# Patient Record
Sex: FEMALE | NOT HISPANIC OR LATINO | URBAN - METROPOLITAN AREA
[De-identification: names, ages, dates, MRNs, and addresses within clinical notes are randomized per-mention and may not be internally consistent; named-entity substitution may affect disease eponyms.]

---

## 2023-08-08 ENCOUNTER — APPOINTMENT (RX ONLY)
Dept: URBAN - METROPOLITAN AREA CLINIC 83 | Facility: CLINIC | Age: 33
Setting detail: DERMATOLOGY
End: 2023-08-08

## 2023-08-08 DIAGNOSIS — L72.11 PILAR CYST: ICD-10-CM

## 2023-08-08 PROCEDURE — ? ADDITIONAL NOTES

## 2023-08-08 PROCEDURE — 99202 OFFICE O/P NEW SF 15 MIN: CPT

## 2023-08-08 PROCEDURE — ? CONSULTATION FOR EXCISION

## 2023-08-08 ASSESSMENT — LOCATION ZONE DERM: LOCATION ZONE: SCALP

## 2023-08-08 ASSESSMENT — LOCATION SIMPLE DESCRIPTION DERM: LOCATION SIMPLE: ANTERIOR SCALP

## 2023-08-08 ASSESSMENT — LOCATION DETAILED DESCRIPTION DERM: LOCATION DETAILED: MID-FRONTAL SCALP

## 2023-08-08 NOTE — PROCEDURE: CONSULTATION FOR EXCISION OF BENIGN LESIONS
Detail Level: Detailed
Size Of Lesion: 0.6
X Size Of Lesion In Cm (Optional): 0
Anatomic Location From Referring Provider: mid frontal scalp

## 2023-08-08 NOTE — PROCEDURE: ADDITIONAL NOTES
Render Risk Assessment In Note?: no
Additional Notes: Patient will Sched an excision for removal after she is done breast feeding in one month. \\nSpot on scalp, too small for photo.
Detail Level: Simple

## 2023-08-08 NOTE — HPI: SKIN LESION
What Type Of Note Output Would You Prefer (Optional)?: Standard Output
Has Your Skin Lesion Been Treated?: not been treated
Is This A New Presentation, Or A Follow-Up?: Skin Lesion
Additional History: Patient stated a spot on her scalp has been there for many years.

## 2023-09-12 ENCOUNTER — APPOINTMENT (RX ONLY)
Dept: URBAN - METROPOLITAN AREA CLINIC 83 | Facility: CLINIC | Age: 33
Setting detail: DERMATOLOGY
End: 2023-09-12

## 2023-09-12 VITALS — SYSTOLIC BLOOD PRESSURE: 113 MMHG | HEART RATE: 51 BPM | DIASTOLIC BLOOD PRESSURE: 73 MMHG

## 2023-09-12 DIAGNOSIS — L72.8 OTHER FOLLICULAR CYSTS OF THE SKIN AND SUBCUTANEOUS TISSUE: ICD-10-CM

## 2023-09-12 PROCEDURE — ? CONSULTATION EXCISION

## 2023-09-12 PROCEDURE — 11441 EXC FACE-MM B9+MARG 0.6-1 CM: CPT

## 2023-09-12 PROCEDURE — ? EXCISION

## 2023-09-12 PROCEDURE — 12051 INTMD RPR FACE/MM 2.5 CM/<: CPT

## 2023-09-12 ASSESSMENT — LOCATION ZONE DERM: LOCATION ZONE: FACE

## 2023-09-12 ASSESSMENT — LOCATION DETAILED DESCRIPTION DERM: LOCATION DETAILED: SUPERIOR MID FOREHEAD

## 2023-09-12 ASSESSMENT — LOCATION SIMPLE DESCRIPTION DERM: LOCATION SIMPLE: SUPERIOR FOREHEAD

## 2023-09-12 NOTE — PROCEDURE: EXCISION

## 2023-09-19 ENCOUNTER — APPOINTMENT (RX ONLY)
Dept: URBAN - METROPOLITAN AREA CLINIC 79 | Facility: CLINIC | Age: 33
Setting detail: DERMATOLOGY
End: 2023-09-19

## 2023-09-19 DIAGNOSIS — Z48.02 ENCOUNTER FOR REMOVAL OF SUTURES: ICD-10-CM

## 2023-09-19 PROCEDURE — ? ADDITIONAL NOTES

## 2023-09-19 PROCEDURE — ? COUNSELING

## 2023-09-19 PROCEDURE — ? SUTURE REMOVAL (GLOBAL PERIOD)

## 2023-09-19 ASSESSMENT — LOCATION ZONE DERM: LOCATION ZONE: SCALP

## 2023-09-19 ASSESSMENT — LOCATION SIMPLE DESCRIPTION DERM: LOCATION SIMPLE: POSTERIOR SCALP

## 2023-09-19 ASSESSMENT — LOCATION DETAILED DESCRIPTION DERM: LOCATION DETAILED: POSTERIOR MID-PARIETAL SCALP

## 2023-09-19 NOTE — PROCEDURE: ADDITIONAL NOTES
Additional Notes: Skin Tag removed left axilla.  Per Provider no charge
Render Risk Assessment In Note?: no
Detail Level: Simple

## 2023-09-19 NOTE — PROCEDURE: SUTURE REMOVAL (GLOBAL PERIOD)
Detail Level: Detailed
Add 72139 Cpt? (Important Note: In 2017 The Use Of 83588 Is Being Tracked By Cms To Determine Future Global Period Reimbursement For Global Periods): no

## 2023-11-07 ENCOUNTER — APPOINTMENT (RX ONLY)
Dept: URBAN - METROPOLITAN AREA CLINIC 79 | Facility: CLINIC | Age: 33
Setting detail: DERMATOLOGY
End: 2023-11-07

## 2023-11-07 DIAGNOSIS — Z41.9 ENCOUNTER FOR PROCEDURE FOR PURPOSES OTHER THAN REMEDYING HEALTH STATE, UNSPECIFIED: ICD-10-CM

## 2023-11-07 PROCEDURE — ? COSMETIC QUOTE

## 2023-11-07 PROCEDURE — ? BOTOX

## 2023-11-07 PROCEDURE — ? ADDITIONAL NOTES

## 2023-11-07 ASSESSMENT — LOCATION DETAILED DESCRIPTION DERM: LOCATION DETAILED: RIGHT INFERIOR MEDIAL FOREHEAD

## 2023-11-07 ASSESSMENT — LOCATION SIMPLE DESCRIPTION DERM: LOCATION SIMPLE: RIGHT FOREHEAD

## 2023-11-07 ASSESSMENT — LOCATION ZONE DERM: LOCATION ZONE: FACE

## 2023-11-07 NOTE — PROCEDURE: ADDITIONAL NOTES
Additional Notes: Buy 30 units, get 10 free
Detail Level: Simple
Render Risk Assessment In Note?: no

## 2023-11-07 NOTE — PROCEDURE: BOTOX
Forehead Units: 0
Show Lcl Units: No
Show Topical Anesthesia: Yes
Show Inventory Tab: Show
R Brow Units: 1
Additional Area 2 Location: TMJ right
Glabellar Complex Units: 15
Incrementing Botox Units: By 0.5 Units
Additional Area 1 Location: [facility]
Consent: Written consent obtained. Risks include but not limited to lid/brow ptosis, bruising, swelling, diplopia, temporary effect, incomplete chemical denervation.
Post-Care Instructions: Patient instructed to not lie down for 4 hours and limit physical activity for 24 hours.
Additional Area 1 Units: 12
Detail Level: Detailed
Dilution (U/0.1 Cc): 2

## 2023-11-07 NOTE — PROCEDURE: COSMETIC QUOTE
Number Of Months: 1
Injectable 5 Price/Unit (In Dollars- Use Only Numbers And Decimals): 0.00
Breast Procedure 2 Units: 0
Injectable 1 Price/Unit (In Dollars- Use Only Numbers And Decimals): 9
Include Signature: No
Include Sales Tax On Surgeon's Fees: Yes
Detail Level: Zone
Injectable 1: botox
Injectable 1 Units: 58 [facility]

## 2024-01-31 ENCOUNTER — APPOINTMENT (RX ONLY)
Dept: URBAN - METROPOLITAN AREA CLINIC 79 | Facility: CLINIC | Age: 34
Setting detail: DERMATOLOGY
End: 2024-01-31

## 2024-01-31 DIAGNOSIS — Z41.9 ENCOUNTER FOR PROCEDURE FOR PURPOSES OTHER THAN REMEDYING HEALTH STATE, UNSPECIFIED: ICD-10-CM

## 2024-01-31 PROCEDURE — ? BOTOX

## 2024-01-31 PROCEDURE — ? COSMETIC QUOTE

## 2024-01-31 ASSESSMENT — LOCATION SIMPLE DESCRIPTION DERM
LOCATION SIMPLE: LEFT CHEEK
LOCATION SIMPLE: INFERIOR FOREHEAD

## 2024-01-31 ASSESSMENT — LOCATION DETAILED DESCRIPTION DERM
LOCATION DETAILED: INFERIOR MID FOREHEAD
LOCATION DETAILED: LEFT INFERIOR MEDIAL MALAR CHEEK

## 2024-01-31 ASSESSMENT — LOCATION ZONE DERM: LOCATION ZONE: FACE

## 2024-01-31 NOTE — PROCEDURE: COSMETIC QUOTE
Anesthesia 8 Price/Unit (In Dollars- Use Only Numbers And Decimals): 0.00
Misc 5 Percentage Discount: 0
Facility Fee Units (Optional): 1
Include Sales Tax On Surgeon's Fees: Yes
Apply Anesthesia Fee: No
Detail Level: Zone
Injectable 1 Price/Unit (In Dollars- Use Only Numbers And Decimals): 9
Laser 1: IPL
Injectable 1: botox
Injectable 1 Units: 50
Laser 1 Price/Unit (In Dollars- Use Only Numbers And Decimals): 550

## 2024-01-31 NOTE — PROCEDURE: BOTOX
Left Periorbital Skin Units: 0
Forehead Units: 15
Show Forehead Units: Yes
Show Inventory Tab: Show
Show Right And Left Pupillary Line Units: No
Additional Area 3 Units: 18
Additional Area 2 Location: TMJ right
Additional Area 4 Location: lat brow
Consent: Written consent obtained. Risks include but not limited to lid/brow ptosis, bruising, swelling, diplopia, temporary effect, incomplete chemical denervation.
Additional Area 1 Location: TMJ LEFT
Incrementing Botox Units: By 0.25 Units
Dilution (U/0.1 Cc): 2
Post-Care Instructions: Patient instructed to not lie down for 4 hours and limit physical activity for 24 hours.
Detail Level: Detailed
Additional Area 3 Location: [facility]